# Patient Record
Sex: FEMALE | Race: WHITE | NOT HISPANIC OR LATINO | URBAN - METROPOLITAN AREA
[De-identification: names, ages, dates, MRNs, and addresses within clinical notes are randomized per-mention and may not be internally consistent; named-entity substitution may affect disease eponyms.]

---

## 2020-08-03 ENCOUNTER — EMERGENCY (EMERGENCY)
Facility: HOSPITAL | Age: 20
LOS: 0 days | Discharge: HOME | End: 2020-08-03
Payer: COMMERCIAL

## 2020-08-03 VITALS
DIASTOLIC BLOOD PRESSURE: 51 MMHG | TEMPERATURE: 98 F | SYSTOLIC BLOOD PRESSURE: 94 MMHG | OXYGEN SATURATION: 100 % | RESPIRATION RATE: 18 BRPM | HEART RATE: 66 BPM

## 2020-08-03 DIAGNOSIS — Z03.818 ENCOUNTER FOR OBSERVATION FOR SUSPECTED EXPOSURE TO OTHER BIOLOGICAL AGENTS RULED OUT: ICD-10-CM

## 2020-08-03 DIAGNOSIS — Z11.59 ENCOUNTER FOR SCREENING FOR OTHER VIRAL DISEASES: ICD-10-CM

## 2020-08-03 PROCEDURE — 99283 EMERGENCY DEPT VISIT LOW MDM: CPT | Mod: CR

## 2020-08-03 PROCEDURE — 99283 EMERGENCY DEPT VISIT LOW MDM: CPT

## 2020-08-03 PROCEDURE — U0003: CPT

## 2020-08-03 NOTE — ED STATDOCS - PATIENT PORTAL LINK FT
You can access the FollowMyHealth Patient Portal offered by [facility] by registering at the following website: [email or web address]. By joining [employer]’s FollowMyHealth portal, you will also be able to view your health information using other applications (apps) compatible with our system.

## 2020-08-03 NOTE — ED STATDOCS - OBJECTIVE STATEMENT
19 y/o Female presents to the ED with concern for Covid 19 s/p exposure.  Pt denies symptoms including cough, fever, SOB, sore throat, diarrhea, loss of smell. Pt here for testing.

## 2020-08-04 LAB — SARS-COV-2 RNA SPEC QL NAA+PROBE: SIGNIFICANT CHANGE UP

## 2020-09-29 ENCOUNTER — EMERGENCY (EMERGENCY)
Facility: HOSPITAL | Age: 20
LOS: 0 days | Discharge: HOME | End: 2020-09-29
Attending: STUDENT IN AN ORGANIZED HEALTH CARE EDUCATION/TRAINING PROGRAM
Payer: COMMERCIAL

## 2020-09-29 VITALS
OXYGEN SATURATION: 100 % | TEMPERATURE: 98 F | WEIGHT: 139.99 LBS | SYSTOLIC BLOOD PRESSURE: 126 MMHG | HEART RATE: 77 BPM | RESPIRATION RATE: 15 BRPM | DIASTOLIC BLOOD PRESSURE: 77 MMHG

## 2020-09-29 VITALS — WEIGHT: 154.76 LBS

## 2020-09-29 DIAGNOSIS — Z23 ENCOUNTER FOR IMMUNIZATION: ICD-10-CM

## 2020-09-29 DIAGNOSIS — Z20.3 CONTACT WITH AND (SUSPECTED) EXPOSURE TO RABIES: ICD-10-CM

## 2020-09-29 PROCEDURE — 90472 IMMUNIZATION ADMIN EACH ADD: CPT

## 2020-09-29 PROCEDURE — 90399 UNLISTED IMMUNE GLOBULIN: CPT

## 2020-09-29 PROCEDURE — 99283 EMERGENCY DEPT VISIT LOW MDM: CPT | Mod: 25

## 2020-09-29 PROCEDURE — 99283 EMERGENCY DEPT VISIT LOW MDM: CPT

## 2020-09-29 PROCEDURE — 90471 IMMUNIZATION ADMIN: CPT

## 2020-09-29 PROCEDURE — 90715 TDAP VACCINE 7 YRS/> IM: CPT

## 2020-09-29 PROCEDURE — 90675 RABIES VACCINE IM: CPT

## 2020-09-29 PROCEDURE — 96372 THER/PROPH/DIAG INJ SC/IM: CPT

## 2020-09-29 RX ORDER — TETANUS TOXOID, REDUCED DIPHTHERIA TOXOID AND ACELLULAR PERTUSSIS VACCINE, ADSORBED 5; 2.5; 8; 8; 2.5 [IU]/.5ML; [IU]/.5ML; UG/.5ML; UG/.5ML; UG/.5ML
0.5 SUSPENSION INTRAMUSCULAR ONCE
Refills: 0 | Status: COMPLETED | OUTPATIENT
Start: 2020-09-29 | End: 2020-09-29

## 2020-09-29 RX ORDER — HUMAN RABIES VIRUS IMMUNE GLOBULIN 150 [IU]/ML
1400 INJECTION, SOLUTION INTRAMUSCULAR ONCE
Refills: 0 | Status: COMPLETED | OUTPATIENT
Start: 2020-09-29 | End: 2020-09-29

## 2020-09-29 RX ORDER — RABIES VACC, HUMAN DIPLOID/PF 2.5 UNIT
1 VIAL (EA) INTRAMUSCULAR ONCE
Refills: 0 | Status: COMPLETED | OUTPATIENT
Start: 2020-09-29 | End: 2020-09-29

## 2020-09-29 RX ADMIN — Medication 1 MILLILITER(S): at 11:59

## 2020-09-29 RX ADMIN — HUMAN RABIES VIRUS IMMUNE GLOBULIN 1400 UNIT(S): 150 INJECTION, SOLUTION INTRAMUSCULAR at 12:01

## 2020-09-29 RX ADMIN — TETANUS TOXOID, REDUCED DIPHTHERIA TOXOID AND ACELLULAR PERTUSSIS VACCINE, ADSORBED 0.5 MILLILITER(S): 5; 2.5; 8; 8; 2.5 SUSPENSION INTRAMUSCULAR at 10:56

## 2020-09-29 NOTE — ED STATDOCS - OBJECTIVE STATEMENT
19 y/o female with no significant PMHx presents to the ED for evaluation. Pt reports she was in [place] 2 days ago and was walking under a bridge when a bunch of bats flew around her. Pt reports she was not bit but is unsure if she was scratched. Pt reports she felt itchy after. Pt was seen in an ED in [place] after exposure. No other complaints at this time.

## 2020-09-29 NOTE — ED STATDOCS - NSFOLLOWUPINSTRUCTIONS_ED_ALL_ED_FT
YOU WILL NEED TO RETURN FOR YOUR RABIES VACCINE INJECTION 10/2, 10/6 AND 10/13.      Rabies Immune Globulin (By injection)  Rabies Immune Globulin (RAY-beez i-RICHIEE GLOB-ue-padmini)    Prevents infection caused by the rabies virus after you have been bitten by an animal. Usually given with the rabies vaccine.    Brand Name(s):HyperRAB S/D,Imogam Rabies-HT,Kedrab  There may be other brand names for this medicine.    When This Medicine Should Not Be Used:  This medicine is not right for everyone. You should not receive it if you had an allergic reaction to rabies immune globulin.    How to Use This Medicine:  Injectable  •Your doctor will prescribe your exact dose and tell you how often it should be given. This medicine is given as a shot into one of your muscles.   •A nurse or other health provider will give you this medicine.   •All bite wounds and scratches should be cleaned well right away with soap and water. Other medicines (including povidone-iodine solution, anti-tetanus vaccine, or medicine to treat infection) should be given as directed by your doctor.    Drugs and Foods to Avoid:  Ask your doctor or pharmacist before using any other medicine, including over-the-counter medicines, vitamins, and herbal products.  •You should not receive measles vaccine within 4 months after you receive rabies immune globulin.  •You should not receive other live virus vaccines within 3 months after you receive this medicine.    Warnings While Using This Medicine:  •Tell your doctor if you are pregnant or breastfeeding, or if you have bleeding problems, high cholesterol, thrombocytopenia (low platelets), immunoglobulin A (IgA) deficiency, or have had an allergic reaction to any other vaccine. Tell your doctor if you have a history of blood clotting problems or heart disease, or if you are having a surgery that requires inactivity for a long time.  •This medicine may increase your risk for blood problems, including blood clots and hemolysis.  •This medicine is made from donated human blood. All donated blood is tested for certain viruses. Although your risk for getting a virus from the medicine is very low, talk with your doctor if you have concerns.  •Tell any doctor or dentist who treats you that you are using this medicine. This medicine may affect certain medical test results.   •Your doctor will do lab tests at regular visits to check on the effects of this medicine. Keep all appointments.     Possible Side Effects While Using This Medicine:  Call your doctor right away if you notice any of these side effects:  •Allergic reaction: Itching or hives, swelling in your face or hands, swelling or tingling in your mouth or throat, chest tightness, trouble breathing  •Chest pain or tightness, trouble breathing, coughing up blood  •Cloudy, foamy, or bloody urine  •Numbness or weakness on one side of your body, sudden or severe headache, problems with vision, speech, or walking  •Pain in your lower leg (calf)    If you notice these less serious side effects, talk with your doctor:  •Dizziness  •Mild fever  •Pain, redness, swelling, or a hard lump where the shot was given    If you notice other side effects that you think are caused by this medicine, tell your doctor.  Call your doctor for medical advice about side effects. You may report side effects to FDA at 9-806-FDA-7839          Rabies Vaccine    WHAT YOU NEED TO KNOW:    What is the rabies vaccine? The rabies vaccine is an injection given to help prevent infection from the virus that causes rabies. The rabies virus is spread to humans through the bite of an infected animal. Dogs, bats, skunks, coyotes, raccoons, and foxes are examples of animals that can carry rabies. The rabies vaccine can protect you from being infected with the virus. The vaccine can also prevent you from developing rabies even if you get it after you were bitten by an infected animal.    When is the vaccine given? Your healthcare provider will tell you how many doses of the vaccine you need. He or she will give you an injection schedule. Plan to get all of the doses on the days they are scheduled, especially the first 2 doses. Do not put off getting the injections or try to schedule them all for the same day. Tell your provider if you think anything may keep you from getting all the doses as scheduled. He or she may be able to help you find ways to stay on schedule. The following is a common dosing schedule:   •Before exposure to the virus, the vaccine is given in 3 doses. The first dose can be given at any time. The second dose is given 7 days after the first. The third dose is given 21 or 28 days after the first. Plan to get all of the doses 3 to 4 weeks before you travel.      •After exposure to the virus, the vaccine is usually given in 2 or 4 doses: ?A person who has not already had the vaccine will usually get 4 doses. The first dose is given as soon after exposure to rabies as possible. A shot called rabies immune globulin is given at the same time as the first dose. This medicine helps your immune system fight the infection. The other doses are given on days 3, 7, and 14 after the exposure. You may also need a dose 28 days after the exposure if you have a weak immune system. Your healthcare provider will tell you if you need 4 or 5 doses.      ?A person who has already had the vaccine will get 2 doses. The first is given immediately, and the second is given on day 3 after the exposure. Rabies immune globulin is not given.      •Booster doses may be needed over time if you stay at high risk for rabies. You are at increased risk for rabies if: ?Your work involves handling animals that can carry rabies.      ?You work in a rabies laboratory.      ?You often go into caves where bats live.      ?You often travel to a country where rabies is common.        What should I do if I miss a dose or will miss a scheduled dose? Call your healthcare provider right away. He or she will tell you what to do. The best way to be protected is to stay on the injection schedule given to you. This is especially important if you are getting the vaccine after exposure to the rabies virus. Reschedule any makeup dose or upcoming dose for as close to the original appointment as possible. Remember that you cannot get more than 1 dose on any day.    Who should not get the rabies vaccine or should wait to get it?   •Tell your healthcare provider if you had a severe allergic reaction to the rabies vaccine or to another vaccine. If you are getting the vaccine before exposure, do not get another dose. After exposure, you need to get all the doses even if you are at risk for an allergic reaction. Your healthcare provider may need to take extra precautions before you get another dose.      •Tell your provider about all of your allergies. Also tell him or her if you have a disease that affects your immune system (such as HIV/AIDS) or you have cancer. Tell him or her if you are taking medicines that affect your immune system or any cancer treatment drug or radiation. Tell him or her if you are ill. You may need to wait to get the vaccine until you feel better.      What are the risks of the rabies vaccine? You may have a severe allergic reaction. The area where you got the shot may become red, swollen, or painful. You may develop a headache or muscle aches. You may have nausea or pain in your abdomen. You may develop rabies even after you get the vaccine.    Call your local emergency number (911 in the [place]) or have someone else call if:   •Your mouth and throat are swollen.      •You are wheezing or have trouble breathing.      •You have chest pain or your heart is beating faster than normal for you.      •You feel like you are going to faint.      When should I seek immediate care?   •Your face is red or swollen.      •You have hives that spread over your body.      •You feel weak or dizzy.      When should I call my doctor?   •You have increased pain, redness, or swelling around the area where the shot was given.      •You have flu-like symptoms.      •You have questions or concerns about the rabies vaccine.      CARE AGREEMENT:    You have the right to help plan your care. Learn about your health condition and how it may be treated. Discuss treatment options with your healthcare providers to decide what care you want to receive. You always have the right to refuse treatment.

## 2020-09-29 NOTE — ED ADULT NURSE NOTE - CHPI ED NUR SYMPTOMS NEG
no tingling/no weakness/no decreased eating/drinking/no chills/no dizziness/no vomiting/no pain/no fever/no nausea

## 2020-09-29 NOTE — ED ADULT TRIAGE NOTE - CHIEF COMPLAINT QUOTE
pt presents to ED due to possible exposure to a bat over the weekend pt states she walked under a bridge and a bat flew past and then another bat came very close to her back

## 2020-09-29 NOTE — ED ADULT NURSE NOTE - OBJECTIVE STATEMENT
Pt came to the Ed for a bat exposer. pt states that on Sunday she was walking outside and 2 bats  were  flying around her and touching   her back .pt is afraid of rabbis. No other medical problems.

## 2020-09-29 NOTE — ED STATDOCS - PATIENT PORTAL LINK FT
You can access the FollowMyHealth Patient Portal offered by [facility] by registering at the following website: [email or web address]. By joining "[employer]’s FollowMyHealth portal, you will also be able to view your health information using other applications (apps) compatible with our system.

## 2020-09-29 NOTE — ED STATDOCS - CLINICAL SUMMARY MEDICAL DECISION MAKING FREE TEXT BOX
21 y/o female with bat exposure. Will give Tdap, contact [facility]. 21 y/o female with bat exposure. Will give Tdap, contact [facility].     I spoke with Skyla at [facility].  Pt. to receive Rabies IG, Donna.  Return 10/2, Tues. 10/6, Tuesday 10/13.  Jaci Lee PA-C

## 2020-09-29 NOTE — ED STATDOCS - PROGRESS NOTE DETAILS
Pt. presents with bat exposure 9/27.  Pt. reports being in [place] and was walking under a bridge when a bat flew by her and turned and again flew by her.  Pt. ran.  Pt. does not think she was bitten but has itch to bilateral shoulders.  Pt. was evaluated in an ER in [facility] and discharged without rabies treatment.  Pt. with bilateral shoulder acne and scratch marks from itching.  No visible puncture wounds.  I spoke with Skyla at [facility]. Pt. presents with bat exposure 9/27.  Pt. reports being in [place] and was walking under a bridge when a bat flew by her and turned and again flew by her.  Pt. ran.  Pt. does not think she was bitten but has itch to bilateral shoulders.  Pt. was evaluated in an ER in [facility] and discharged without rabies treatment.  Pt. with bilateral shoulder acne and scratch marks from itching.  No visible puncture wounds.  I spoke with Skyla at [facility].  Pt. to receive Rabies IG, Donna.  Return 10/2, Tues. 10/6, Tuesday 10/13.  Jaci Lee PA-C

## 2020-09-29 NOTE — ED STATDOCS - ATTENDING CONTRIBUTION TO CARE
I, Maame Marinelli DO,  performed the initial face to face bedside interview with this patient regarding history of present illness, review of symptoms and relevant past medical, social and family history.  I completed an independent physical examination.  I was the initial provider who evaluated this patient. I have signed out the follow up of any pending tests (i.e. labs, radiological studies) to the ACP.  I have communicated the patient’s plan of care and disposition with the ACP.  The history, relevant review of systems, past medical and surgical history, medical decision making, and physical examination was documented by the scribe in my presence and I attest to the accuracy of the documentation.

## 2020-10-02 ENCOUNTER — EMERGENCY (EMERGENCY)
Facility: HOSPITAL | Age: 20
LOS: 0 days | Discharge: HOME | End: 2020-10-02
Attending: EMERGENCY MEDICINE
Payer: COMMERCIAL

## 2020-10-02 VITALS
TEMPERATURE: 99 F | OXYGEN SATURATION: 100 % | RESPIRATION RATE: 17 BRPM | WEIGHT: 149.91 LBS | DIASTOLIC BLOOD PRESSURE: 69 MMHG | SYSTOLIC BLOOD PRESSURE: 117 MMHG | HEART RATE: 60 BPM

## 2020-10-02 DIAGNOSIS — Z20.3 CONTACT WITH AND (SUSPECTED) EXPOSURE TO RABIES: ICD-10-CM

## 2020-10-02 DIAGNOSIS — Z23 ENCOUNTER FOR IMMUNIZATION: ICD-10-CM

## 2020-10-02 PROBLEM — Z78.9 OTHER SPECIFIED HEALTH STATUS: Chronic | Status: ACTIVE | Noted: 2020-09-29

## 2020-10-02 PROCEDURE — 99283 EMERGENCY DEPT VISIT LOW MDM: CPT | Mod: 25

## 2020-10-02 PROCEDURE — 90471 IMMUNIZATION ADMIN: CPT | Mod: XU

## 2020-10-02 PROCEDURE — 90675 RABIES VACCINE IM: CPT

## 2020-10-02 PROCEDURE — 99283 EMERGENCY DEPT VISIT LOW MDM: CPT

## 2020-10-02 RX ORDER — RABIES VACC, HUMAN DIPLOID/PF 2.5 UNIT
1 VIAL (EA) INTRAMUSCULAR ONCE
Refills: 0 | Status: COMPLETED | OUTPATIENT
Start: 2020-10-02 | End: 2020-10-02

## 2020-10-02 RX ADMIN — Medication 1 MILLILITER(S): at 09:53

## 2020-10-02 NOTE — ED PROVIDER NOTE - NSFOLLOWUPINSTRUCTIONS_ED_ALL_ED_FT
1. return for worsening symptoms or anything concerning to you  2. take all home meds as prescribed  3. follow up with your pmd call to make an appointment  4. follow the vaccine schedule given to you

## 2020-10-02 NOTE — ED ADULT NURSE NOTE - OBJECTIVE STATEMENT
patient is a 20 year old female AxOX4 comes to ED for follow up rabies vaccine, pt is on day 2.  No signs of respiratory distress, airway patent vitals stable at this time. denies fevers, cough or SOB in the past two weeks. Patient [place]

## 2020-10-02 NOTE — ED PROVIDER NOTE - OBJECTIVE STATEMENT
21 y/o female with no significant PMHx presents ambulatory to the ED for rabies vaccine. Pt was seen at [facility] 3 days ago and received first rabies vaccine. Pt reports she was in [place] 5 days ago and was walking under a bridge when a lot of bats flew around her. States she was not bit by a bat but is unsure if she was scratched. Denies any reaction to first vaccine. No other complaints at this time. Allergies: Amoxicillin

## 2020-10-02 NOTE — ED PROVIDER NOTE - PHYSICAL EXAMINATION
Constitutional: NAD AAOx3  Eyes: PERRLA EOMI  Head: Normocephalic atraumatic  Mouth: MMM  Cardiac: regular rate   Resp: Lungs CTAB  GI: Abd s/nt/nd  Neuro: CN2-12 intact  Skin: No rashes Constitutional: NAD AAOx3  Eyes: PERRLA EOMI  Head: Normocephalic atraumatic  Cardiac: regular rate   Neuro: moving all extremities

## 2020-10-02 NOTE — ED PROVIDER NOTE - CLINICAL SUMMARY MEDICAL DECISION MAKING FREE TEXT BOX
19 y/o female presents for second rabies vaccine. No reaction to first. Will give vaccine and have pt continue on schedule.

## 2020-10-02 NOTE — ED PROVIDER NOTE - NS_ ATTENDINGSCRIBEDETAILS _ED_A_ED_FT
I, Bruno Nuñez MD,  performed the initial face to face bedside interview with this patient regarding history of present illness, review of symptoms and relevant past medical, social and family history.  I completed an independent physical examination.  I was the initial provider who evaluated this patient.  The history, relevant review of systems, past medical and surgical history, medical decision making, and physical examination was documented by the scribe in my presence and I attest to the accuracy of the documentation.

## 2020-10-02 NOTE — ED PROVIDER NOTE - PATIENT PORTAL LINK FT
You can access the FollowMyHealth Patient Portal offered by [facility] by registering at the following website: [email or web address]. By joining [employer]’s FollowMyHealth portal, you will also be able to view your health information using other applications (apps) compatible with our system.

## 2020-10-03 ENCOUNTER — EMERGENCY (EMERGENCY)
Facility: HOSPITAL | Age: 20
LOS: 0 days | Discharge: HOME | End: 2020-10-03
Payer: COMMERCIAL

## 2020-10-03 VITALS
HEART RATE: 84 BPM | OXYGEN SATURATION: 100 % | SYSTOLIC BLOOD PRESSURE: 115 MMHG | RESPIRATION RATE: 17 BRPM | DIASTOLIC BLOOD PRESSURE: 75 MMHG | TEMPERATURE: 98 F

## 2020-10-03 DIAGNOSIS — Z20.828 CONTACT WITH AND (SUSPECTED) EXPOSURE TO OTHER VIRAL COMMUNICABLE DISEASES: ICD-10-CM

## 2020-10-03 LAB — SARS-COV-2 RNA SPEC QL NAA+PROBE: SIGNIFICANT CHANGE UP

## 2020-10-03 PROCEDURE — U0003: CPT

## 2020-10-03 PROCEDURE — 99283 EMERGENCY DEPT VISIT LOW MDM: CPT

## 2020-10-03 PROCEDURE — 99283 EMERGENCY DEPT VISIT LOW MDM: CPT | Mod: CR

## 2020-10-03 NOTE — ED STATDOCS - PATIENT PORTAL LINK FT
You can access the FollowMyHealth Patient Portal offered by [facility] by registering at the following website: [email or web address]. By joining [employer]’s FollowMyHealth portal, you will also be able to view your health information using other applications (apps) compatible with our system.

## 2020-10-03 NOTE — ED STATDOCS - OBJECTIVE STATEMENT
Pt presents to ED for covid swab. Needs swab prior to visiting family. No complaints at this time.  Pt here for testing.

## 2020-10-06 ENCOUNTER — EMERGENCY (EMERGENCY)
Facility: HOSPITAL | Age: 20
LOS: 0 days | Discharge: HOME | End: 2020-10-06
Attending: EMERGENCY MEDICINE
Payer: COMMERCIAL

## 2020-10-06 VITALS
SYSTOLIC BLOOD PRESSURE: 106 MMHG | HEART RATE: 73 BPM | TEMPERATURE: 99 F | DIASTOLIC BLOOD PRESSURE: 63 MMHG | OXYGEN SATURATION: 100 % | RESPIRATION RATE: 15 BRPM

## 2020-10-06 VITALS — WEIGHT: 125 LBS

## 2020-10-06 DIAGNOSIS — Z20.3 CONTACT WITH AND (SUSPECTED) EXPOSURE TO RABIES: ICD-10-CM

## 2020-10-06 DIAGNOSIS — Z29.14 ENCOUNTER FOR PROPHYLACTIC RABIES IMMUNE GLOBIN: ICD-10-CM

## 2020-10-06 DIAGNOSIS — Z88.0 ALLERGY STATUS TO PENICILLIN: ICD-10-CM

## 2020-10-06 PROCEDURE — 90675 RABIES VACCINE IM: CPT

## 2020-10-06 PROCEDURE — 90471 IMMUNIZATION ADMIN: CPT

## 2020-10-06 PROCEDURE — 99283 EMERGENCY DEPT VISIT LOW MDM: CPT

## 2020-10-06 PROCEDURE — 99281 EMR DPT VST MAYX REQ PHY/QHP: CPT | Mod: 25

## 2020-10-06 RX ORDER — RABIES VACC, HUMAN DIPLOID/PF 2.5 UNIT
1 VIAL (EA) INTRAMUSCULAR ONCE
Refills: 0 | Status: COMPLETED | OUTPATIENT
Start: 2020-10-06 | End: 2020-10-06

## 2020-10-06 RX ADMIN — Medication 1 MILLILITER(S): at 12:23

## 2020-10-06 NOTE — ED STATDOCS - ATTENDING CONTRIBUTION TO CARE
I, Henrique Lopez MD,  performed the initial face to face bedside interview with this patient regarding history of present illness, review of symptoms and relevant past medical, social and family history.  I completed an independent physical examination.  I was the initial provider who evaluated this patient. I have signed out the follow up of any pending tests (i.e. labs, radiological studies) to the ACP.  I have communicated the patient’s plan of care and disposition with the ACP.  The history, relevant review of systems, past medical and surgical history, medical decision making, and physical examination was documented by the scribe in my presence and I attest to the accuracy of the documentation.

## 2020-10-06 NOTE — ED STATDOCS - OBJECTIVE STATEMENT
21 y/o female with no significant PMHx presents ambulatory to the ED for rabies vaccine. Pt here for third rabies vaccine. Pt was seen at ED 10/02 and received second rabies vaccine. Pt reports she was in [place] approximately 2 weeks ago and was walking under a bridge when a lot of bats flew around her. States she was not bit by a bat but is unsure if she was scratched. No other complaints at this time. Allergies: Amoxicillin. PCP: Michelle Sanabria.

## 2020-10-06 NOTE — ED ADULT NURSE NOTE - NSIMPLEMENTINTERV_GEN_ALL_ED
Implemented All Universal Safety Interventions:  [place] to call system. Call bell, personal items and telephone within reach. Instruct patient to call for assistance. Room bathroom lighting operational. Non-slip footwear when patient is off stretcher. Physically safe environment: no spills, clutter or unnecessary equipment. Stretcher in lowest position, wheels locked, appropriate side rails in place.

## 2020-10-06 NOTE — ED STATDOCS - PROGRESS NOTE DETAILS
21 y/o F presents for rabies vaccine. pt here for 3rd rabies vaccine, no complaints at this time. -Harjinder Soto PA-C

## 2020-10-06 NOTE — ED STATDOCS - PATIENT PORTAL LINK FT
You can access the FollowMyHealth Patient Portal offered by [facility] by registering at the following website: [email or web address]. By joining [employer]’s FollowMyHealth portal, you will also be able to view your health information using other applications (apps) compatible with our system.

## 2020-10-06 NOTE — ED STATDOCS - NSFOLLOWUPINSTRUCTIONS_ED_ALL_ED_FT
Rabies  Rabies is an infection that affects the brain and central nervous system. It is caused by a virus that can be carried by many kinds of animals. The virus can spread from an infected animal to a person through a bite.  If you have been bitten by an animal with rabies, it is very important that you get treatment right away. Infection almost always results in death, but early treatment may prevent an infection from developing.  What are the causes? This condition is caused by a virus that can be carried by many kinds of animals, including dogs, cats, skunks, bats, woodchucks, raccoons, coyotes, and foxes. The virus spreads through the saliva of infected animals. Most people who get rabies get it from an animal bite.  What are the signs or symptoms? Symptoms of this condition usually start 1–3 months after you are bitten. By the time symptoms start, it is usually too late for lifesaving treatment. Symptoms may include:  Headache.Fever.Fatigue and weakness.Agitation.Anxiety.Confusion.Unusual behavior, such as hyperactivity, fear of water (hydrophobia), or fear of air (aerophobia).Hallucinations.Insomnia.Weakness in the arms or legs.Difficulty swallowing.Most people who are treated right away will never have symptoms.  How is this diagnosed? This condition may be diagnosed based on:  Your history of exposure to animals and animal bites.Your symptoms.Saliva tests.Blood tests.Skin test. Skin samples are taken with a needle.Spinal fluid test. Spinal fluid samples are taken with a needle that is inserted into your back (lumbar puncture).How is this treated? Treatment is often started right away, even if it is not known for sure if the animal that bit you has rabies. Treatment, called post-exposure prophylaxis (PEP), aims to prevent the infection from developing. It involves: Cleaning the wound.Getting an injection to strengthen your body's defense against the rabies virus (immune globulin).Having a series of rabies vaccine injections, usually given over a 2-week period.If the animal that bit you has been caught and is alive, it will be watched to see if it remains healthy. If the animal has been killed, it can be tested for rabies. Follow these instructions at home: Caring for your injury. Follow instructions from your health care provider about how to take care of your wound. Make sure you: Wash your hands with soap and water before you change your bandage (dressing). If soap and water are not available, use hand .Change your dressing as told by your health care provider.Leave stitches (sutures), skin glue, or adhesive strips in place. These skin closures may need to stay in place for 2 weeks or longer. If adhesive strip edges start to loosen and curl up, you may trim the loose edges. Do not remove adhesive strips completely unless your health care provider tells you to do that. Check your wound every day for signs of infection. Check for:  More redness, swelling, or pain.Fluid or blood.Pus or a bad smell.Warmth. Keep the wound dry for as long as told by your health care provider.Keep the wound raised (elevated) above the level of your heart as much as possible.Rest the injured area. Do not use the injured area until your health care provider says it is okay. General instructions:If the animal that bit you was tested for rabies, ask your health care provider or the department performing the test, when the test results will be ready. It is your responsibility to get the test results.Take over-the-counter and prescription medicines only as told by your health care provider.Keep all follow-up visits as told by your health care provider. This is important.How is this prevented? Stay away from stray or wild animals.Get the rabies vaccine if you: Plan to travel to an area where rabies is common.Have a job or hobbies that involve possible contact with wild or stray animals.Make sure your pet stays up to date with rabies vaccinations.Watch your pets when they are outside. Keep them away from wild animals.Report any stray animals to the local animal control services.Get help right away if you: Are bitten by a wild or stray animal.Have had any direct exposure to a bat.Have any symptoms of rabies infection.Have signs that your wound is infected, including: More redness, swelling, or pain around your wound.Fluid or blood coming from your wound.Pus or a bad smell coming from your wound.Your wound feels warm to the touch.Summary: Rabies is an infection that affects the brain and central nervous system.This condition is caused by a virus that can be carried by many kinds of animals, including dogs, cats, skunks, bats, woodchucks, raccoons, coyotes, and foxes.The virus can spread from an infected animal's saliva to a person through a bite.If you are bitten, get treatment right away. This may prevent an infection from developing. Symptoms of an infection usually do not start until 1–3 months after you are bitten. By then it may be too late for lifesaving treatment.This information is not intended to replace advice given to you by your health care provider. Make sure you discuss any questions you have with your health care provider.

## 2020-10-06 NOTE — ED STATDOCS - CLINICAL SUMMARY MEDICAL DECISION MAKING FREE TEXT BOX
Pt presents for rabies vaccine 3, day 7, pt will return for day 14 rabies vaccine one week from today

## 2020-10-13 ENCOUNTER — EMERGENCY (EMERGENCY)
Facility: HOSPITAL | Age: 20
LOS: 0 days | Discharge: HOME | End: 2020-10-13
Attending: EMERGENCY MEDICINE
Payer: COMMERCIAL

## 2020-10-13 VITALS
OXYGEN SATURATION: 100 % | DIASTOLIC BLOOD PRESSURE: 63 MMHG | RESPIRATION RATE: 18 BRPM | TEMPERATURE: 98 F | HEART RATE: 78 BPM | SYSTOLIC BLOOD PRESSURE: 112 MMHG

## 2020-10-13 DIAGNOSIS — Z20.3 CONTACT WITH AND (SUSPECTED) EXPOSURE TO RABIES: ICD-10-CM

## 2020-10-13 DIAGNOSIS — Z23 ENCOUNTER FOR IMMUNIZATION: ICD-10-CM

## 2020-10-13 PROCEDURE — 99281 EMR DPT VST MAYX REQ PHY/QHP: CPT

## 2020-10-13 PROCEDURE — 99281 EMR DPT VST MAYX REQ PHY/QHP: CPT | Mod: 25

## 2020-10-13 PROCEDURE — 90471 IMMUNIZATION ADMIN: CPT

## 2020-10-13 PROCEDURE — 90675 RABIES VACCINE IM: CPT

## 2020-10-13 RX ORDER — RABIES VACC, HUMAN DIPLOID/PF 2.5 UNIT
1 VIAL (EA) INTRAMUSCULAR ONCE
Refills: 0 | Status: COMPLETED | OUTPATIENT
Start: 2020-10-13 | End: 2020-10-13

## 2020-10-13 RX ADMIN — Medication 1 MILLILITER(S): at 10:47

## 2020-10-13 NOTE — ED STATDOCS - ATTENDING CONTRIBUTION TO CARE
I,Viktor Pearson MD,  performed the initial face to face bedside interview with this patient regarding history of present illness, review of symptoms and relevant past medical, social and family history.  I completed an independent physical examination.  I was the initial provider who evaluated this patient. I have signed out the follow up of any pending tests (i.e. labs, radiological studies) to the ACP.  I have communicated the patient’s plan of care and disposition with the ACP.  The history, relevant review of systems, past medical and surgical history, medical decision making, and physical examination was documented by the scribe in my presence and I attest to the accuracy of the documentation.

## 2020-10-13 NOTE — ED STATDOCS - OBJECTIVE STATEMENT
21 y/o female with no significant PMHx presents to the ED for rabies vaccine. Pt states she is here for her 4th and final vaccination. Denies any current symptoms. Allergies: Amoxicillin. PCP: Michelle Sanabria

## 2020-10-13 NOTE — ED STATDOCS - NSFOLLOWUPINSTRUCTIONS_ED_ALL_ED_FT
[facility]                                                                                     Rabies Vaccine: What You Need to Know      1. Why get vaccinated?    Rabies vaccine can prevent rabies.  Rabies is mainly a disease of animals. Humans get rabies when they are bitten or scratched by infected animals.  •Human rabies is rare in the [place]. Wild animals like bats, raccoons, skunks, and foxes are the most common source of human rabies infection in the [place].      •Rabies is more common in other parts of the world where dogs still carry rabies. Most rabies deaths in people around the world are caused by bites from unvaccinated dogs.      Rabies infects the central nervous system. After infection with rabies, at first there might not be any symptoms. Weeks or even months after a bite, rabies can cause general weakness or discomfort, fever, or headache. As the disease progresses, the person may experience delirium, abnormal behavior, hallucinations, hydrophobia (fear of water), and insomnia.    If a person does not receive appropriate medical care after an exposure, human rabies is almost always fatal.    Rabies can be prevented by vaccinating pets, staying away from wildlife, and seeking medical care after potential exposures and before symptoms start.      2. Rabies vaccine  Rabies vaccine is given to people at high risk of rabies to protect them if they are exposed. People at high risk of exposure to rabies should be offered pre-exposure rabies vaccination, including:  •Veterinarians, animal handlers, and veterinary students       •Rabies laboratory workers       •Spelunkers (people who explore caves), and       •Persons who work with live vaccine to produce rabies vaccine and rabies immune globulin.    Pre-exposure rabies vaccination should also be considered for:  •People whose activities bring them into frequent contact with rabies virus or with possibly rabid animals.       •International travelers who are likely to come in contact with animals in parts of the world where rabies is common and immediate access to appropriate care is limited.      For pre-exposure protection, 3 doses of rabies vaccine are recommended. People who may be repeatedly exposed to rabies virus should receive periodic testing for immunity, and booster doses might be necessary. Your health care provider can give you more details.    Rabies vaccine can prevent rabies if given to a person after they have had an exposure. Anyone who has been bitten by an animal suspected to have rabies, or who otherwise may have been exposed to rabies, should clean the wound and see a health care provider immediately regardless of vaccination status. The health care provider can help determine if the person should receive post-exposure rabies vaccination.  For post-exposure protection:  •A person who is exposed and has never been vaccinated against rabies should get 4 doses of rabies vaccine. The person should also get another shot called rabies immune globulin (RIG).       •A person who has been previously vaccinated should get 2 doses of rabies vaccine and does not need Rabies Immune Globulin.      Your health care provider can give you more information.      3. Talk with your health care provider  Tell your vaccine provider if the person getting the vaccine:   •Has had an allergic reaction after a previous dose of rabies vaccine, or has any severe, life-threatening allergies.      •Has a weakened immune system.      In some cases, your health care provider may decide to postpone a routine (non-exposure) dose of rabies vaccination to a future visit.     People with minor illnesses, such as a cold, may be vaccinated. People who are moderately or severely ill should usually wait until they recover before getting a routine (non-exposure) dose of rabies vaccine. If you have been exposed to rabies virus, you should get vaccinated regardless of concurrent illnesses, pregnancy, or breastfeeding.    Your health care provider can give you more information.      4. Risks of a vaccine reaction    •Soreness, redness, swelling, or itching at the site of the injection, and headache, nausea, abdominal pain, muscle aches, or dizziness can happen after rabies vaccine.       •Hives, pain in the joints, or fever sometimes happen after booster doses.       •Very rarely, nervous system disorders such as Guillain–Barré syndrome (GBS) have been reported after rabies vaccine.      People sometimes faint after medical procedures, including vaccination. Tell your provider if you feel dizzy or have vision changes or ringing in the ears.    As with any medicine, there is a very remote chance of a vaccine causing a severe allergic reaction, other serious injury, or death.      5. What if there is a serious problem?    An allergic reaction could occur after the vaccinated person leaves the clinic. If you see signs of a severe allergic reaction (hives, swelling of the face and throat, difficulty breathing, a fast heartbeat, dizziness, or weakness), call 9-1-1 and get the person to the nearest hospital.    For other signs that concern you, call your health care provider.     Adverse reactions should be reported to the Vaccine Adverse Event Reporting System (VAERS). Your health care provider will usually file this report, or you can do it yourself. Visit the VAERS website at www.vaers.[email or web address] or call 1-585.497.7379. VAERS is only for reporting reactions, and VAERS staff do not give medical advice.      6. How can I learn more?    •Ask your health care provider.       •Call your local or state health department.     •Contact the Centers for Disease Control and Prevention (CDC):  •Call 1-865.718.9781 (9-280-ZUJ-INFO) or      •Visit CDC's rabies website at www.cdc.gov/rabies        Vaccine Information Statement Rabies Vaccine (01/08/2020)    This information is not intended to replace advice given to you by your health care provider. Make sure you discuss any questions you have with your health care provider.    Rest. Drink plenty of fluids. Tylenol 650 mg. PO every 4 hrs. for pain.   Follow up with PMD.

## 2020-10-13 NOTE — ED STATDOCS - CARE PROVIDER_API CALL
Landen Barrett  INTERNAL MEDICINE  [address], [address]  [place] [address]  Phone: [phone]  Fax: [phone]  Follow Up Time:

## 2020-10-13 NOTE — ED STATDOCS - PATIENT PORTAL LINK FT
You can access the FollowMyHealth Patient Portal offered by [facility] by registering at the following website: [email or web address]. By joining [facility]’s FollowMyHealth portal, you will also be able to view your health information using other applications (apps) compatible with our system.

## 2020-10-13 NOTE — ED STATDOCS - [facility]PI ED CONTEXT
Patient informed that I would call in a couple of months, she is due for a check up so she can get further refills. Patient verbalized understanding and will call back when she checks her school schedule. Unknown

## 2020-12-24 ENCOUNTER — OUTPATIENT (OUTPATIENT)
Dept: OUTPATIENT SERVICES | Facility: HOSPITAL | Age: 20
LOS: 1 days | End: 2020-12-24
Payer: COMMERCIAL

## 2020-12-24 DIAGNOSIS — Z20.828 CONTACT WITH AND (SUSPECTED) EXPOSURE TO OTHER VIRAL COMMUNICABLE DISEASES: ICD-10-CM

## 2020-12-24 LAB — SARS-COV-2 RNA SPEC QL NAA+PROBE: SIGNIFICANT CHANGE UP

## 2020-12-24 PROCEDURE — U0003: CPT

## 2020-12-24 PROCEDURE — C9803: CPT

## 2020-12-25 DIAGNOSIS — Z20.828 CONTACT WITH AND (SUSPECTED) EXPOSURE TO OTHER VIRAL COMMUNICABLE DISEASES: ICD-10-CM

## 2022-05-17 NOTE — ED STATDOCS - COVID-19 RESULT
[facility] [address] [place] [address] faxed over a letter of medical necessity  It was faxed over on 5/5/22  Can we please review and fax this back to them?     Fax:  [phone]
Notes were faxed to the number that was provided below 
NEGATIVE

## 2023-10-31 NOTE — ED STATDOCS - PHYSICAL EXAMINATION
-- DO NOT REPLY / DO NOT REPLY ALL --  -- Message is from [facility] [employer] ([facility]) --    General Patient Message: Patient calling to cancel her appointment for 11/1 for a consult       Alternative phone number: na    Can a detailed message be left? Yes    Message Turnaround:     Is it Working Hours? Yes - Working Hours     [place]:    Please give this turnaround time to the caller:   \"This message will be sent to [state Provider's name]. The clinical team will fulfill your request as soon as they review your message.\"                
Called pt and changed appointment to virtual.  
Constitutional: NAD AAOx3. Nontoxic, well appearing. Speaking full sentences  w/o distress  Head: Normocephalic atraumatic  Mouth: no airway obstruction  Cardiac: [id number]   Resp: Lungs CTA B/L  Abd: soft, nd. NTTP. No r/g/r.   Neuro: motor and sensory intact

## 2024-11-07 NOTE — ED ADULT NURSE NOTE - CAS EDN DISCHARGE ASSESSMENT
No refill protocol.    Last seen 6/27/24  Cancelled by clinic 8/26/24  NO SHOW 10/28/24  No pending appt.    Last prescribed 6/27/24 haloperidol (HALDOL) 5 MG tablet , taking 1 daily in the morning. # 90 with no refills.    Routing to Dr. Camacho for signature if appropriate.   Alert and oriented to person, place and time